# Patient Record
Sex: FEMALE | Race: BLACK OR AFRICAN AMERICAN | NOT HISPANIC OR LATINO | Employment: OTHER | URBAN - METROPOLITAN AREA
[De-identification: names, ages, dates, MRNs, and addresses within clinical notes are randomized per-mention and may not be internally consistent; named-entity substitution may affect disease eponyms.]

---

## 2024-03-18 ENCOUNTER — HOSPITAL ENCOUNTER (EMERGENCY)
Facility: HOSPITAL | Age: 64
Discharge: HOME | End: 2024-03-18
Payer: COMMERCIAL

## 2024-03-18 ENCOUNTER — APPOINTMENT (OUTPATIENT)
Dept: RADIOLOGY | Facility: HOSPITAL | Age: 64
End: 2024-03-18
Payer: COMMERCIAL

## 2024-03-18 VITALS
OXYGEN SATURATION: 100 % | SYSTOLIC BLOOD PRESSURE: 128 MMHG | BODY MASS INDEX: 21.66 KG/M2 | DIASTOLIC BLOOD PRESSURE: 84 MMHG | TEMPERATURE: 97 F | HEART RATE: 75 BPM | RESPIRATION RATE: 15 BRPM | HEIGHT: 65 IN | WEIGHT: 130 LBS

## 2024-03-18 DIAGNOSIS — M79.604 PAIN IN BOTH LOWER EXTREMITIES: Primary | ICD-10-CM
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
## 2024-03-18 DIAGNOSIS — M79.605 PAIN IN BOTH LOWER EXTREMITIES: Primary | ICD-10-CM

## 2024-03-18 PROCEDURE — 2500000001 HC RX 250 WO HCPCS SELF ADMINISTERED DRUGS (ALT 637 FOR MEDICARE OP): Performed by: PHYSICIAN ASSISTANT

## 2024-03-18 PROCEDURE — 73552 X-RAY EXAM OF FEMUR 2/>: CPT | Mod: BILATERAL PROCEDURE | Performed by: RADIOLOGY

## 2024-03-18 PROCEDURE — 72170 X-RAY EXAM OF PELVIS: CPT

## 2024-03-18 PROCEDURE — 99284 EMERGENCY DEPT VISIT MOD MDM: CPT

## 2024-03-18 PROCEDURE — 73552 X-RAY EXAM OF FEMUR 2/>: CPT | Mod: 50

## 2024-03-18 PROCEDURE — 72170 X-RAY EXAM OF PELVIS: CPT | Mod: FOREIGN READ | Performed by: RADIOLOGY

## 2024-03-18 RX ORDER — NAPROXEN 500 MG/1
500 TABLET ORAL
Qty: 30 TABLET | Refills: 0 | Status: SHIPPED | OUTPATIENT
Start: 2024-03-18 | End: 2024-04-02

## 2024-03-18 RX ORDER — IBUPROFEN 600 MG/1
600 TABLET ORAL ONCE
Status: COMPLETED | OUTPATIENT
Start: 2024-03-18 | End: 2024-03-18

## 2024-03-18 RX ORDER — CYCLOBENZAPRINE HCL 10 MG
10 TABLET ORAL 3 TIMES DAILY PRN
Qty: 20 TABLET | Refills: 0 | Status: SHIPPED | OUTPATIENT
Start: 2024-03-18

## 2024-03-18 RX ORDER — CYCLOBENZAPRINE HCL 5 MG
5 TABLET ORAL ONCE
Status: COMPLETED | OUTPATIENT
Start: 2024-03-18 | End: 2024-03-18

## 2024-03-18 RX ADMIN — CYCLOBENZAPRINE HYDROCHLORIDE 5 MG: 5 TABLET, FILM COATED ORAL at 18:30

## 2024-03-18 RX ADMIN — IBUPROFEN 600 MG: 600 TABLET, FILM COATED ORAL at 18:30

## 2024-03-18 ASSESSMENT — COLUMBIA-SUICIDE SEVERITY RATING SCALE - C-SSRS
2. HAVE YOU ACTUALLY HAD ANY THOUGHTS OF KILLING YOURSELF?: NO
1. IN THE PAST MONTH, HAVE YOU WISHED YOU WERE DEAD OR WISHED YOU COULD GO TO SLEEP AND NOT WAKE UP?: NO
6. HAVE YOU EVER DONE ANYTHING, STARTED TO DO ANYTHING, OR PREPARED TO DO ANYTHING TO END YOUR LIFE?: NO

## 2024-03-18 ASSESSMENT — PAIN SCALES - GENERAL: PAINLEVEL_OUTOF10: 2

## 2024-03-18 ASSESSMENT — PAIN - FUNCTIONAL ASSESSMENT
PAIN_FUNCTIONAL_ASSESSMENT: 0-10
PAIN_FUNCTIONAL_ASSESSMENT: 0-10
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
## 2024-04-03 ENCOUNTER — APPOINTMENT (OUTPATIENT)
Dept: CARDIOLOGY | Facility: HOSPITAL | Age: 64
End: 2024-04-03
Payer: COMMERCIAL

## 2024-04-03 ENCOUNTER — HOSPITAL ENCOUNTER (EMERGENCY)
Facility: HOSPITAL | Age: 64
Discharge: HOME | End: 2024-04-03
Attending: EMERGENCY MEDICINE
Payer: COMMERCIAL

## 2024-04-03 VITALS
RESPIRATION RATE: 16 BRPM | TEMPERATURE: 97.9 F | BODY MASS INDEX: 27.62 KG/M2 | OXYGEN SATURATION: 98 % | DIASTOLIC BLOOD PRESSURE: 77 MMHG | WEIGHT: 176 LBS | SYSTOLIC BLOOD PRESSURE: 121 MMHG | HEIGHT: 67 IN | HEART RATE: 88 BPM

## 2024-04-03 DIAGNOSIS — J18.9 PNEUMONIA OF LOWER LOBE DUE TO INFECTIOUS ORGANISM, UNSPECIFIED LATERALITY: ICD-10-CM

## 2024-04-03 DIAGNOSIS — M79.10 MYALGIA: Primary | ICD-10-CM

## 2024-04-03 LAB
ALBUMIN SERPL BCP-MCNC: 4.4 G/DL (ref 3.4–5)
ALP SERPL-CCNC: 119 U/L (ref 33–136)
ALT SERPL W P-5'-P-CCNC: 11 U/L (ref 7–45)
ANION GAP SERPL CALC-SCNC: 12 MMOL/L (ref 10–20)
AST SERPL W P-5'-P-CCNC: 12 U/L (ref 9–39)
BASOPHILS # BLD AUTO: 0.03 X10*3/UL (ref 0–0.1)
BASOPHILS NFR BLD AUTO: 0.4 %
BILIRUB SERPL-MCNC: 0.3 MG/DL (ref 0–1.2)
BUN SERPL-MCNC: 8 MG/DL (ref 6–23)
CALCIUM SERPL-MCNC: 10.4 MG/DL (ref 8.6–10.3)
CHLORIDE SERPL-SCNC: 104 MMOL/L (ref 98–107)
CK SERPL-CCNC: 141 U/L (ref 0–215)
CO2 SERPL-SCNC: 28 MMOL/L (ref 21–32)
CREAT SERPL-MCNC: 0.87 MG/DL (ref 0.5–1.05)
EGFRCR SERPLBLD CKD-EPI 2021: 75 ML/MIN/1.73M*2
EOSINOPHIL # BLD AUTO: 0.11 X10*3/UL (ref 0–0.7)
EOSINOPHIL NFR BLD AUTO: 1.4 %
ERYTHROCYTE [DISTWIDTH] IN BLOOD BY AUTOMATED COUNT: 12.9 % (ref 11.5–14.5)
GLUCOSE SERPL-MCNC: 103 MG/DL (ref 74–99)
HCT VFR BLD AUTO: 39.8 % (ref 36–46)
HGB BLD-MCNC: 13 G/DL (ref 12–16)
IMM GRANULOCYTES # BLD AUTO: 0.03 X10*3/UL (ref 0–0.7)
IMM GRANULOCYTES NFR BLD AUTO: 0.4 % (ref 0–0.9)
LYMPHOCYTES # BLD AUTO: 3.22 X10*3/UL (ref 1.2–4.8)
LYMPHOCYTES NFR BLD AUTO: 42 %
MCH RBC QN AUTO: 30.2 PG (ref 26–34)
MCHC RBC AUTO-ENTMCNC: 32.7 G/DL (ref 32–36)
MCV RBC AUTO: 93 FL (ref 80–100)
MONOCYTES # BLD AUTO: 0.46 X10*3/UL (ref 0.1–1)
MONOCYTES NFR BLD AUTO: 6 %
NEUTROPHILS # BLD AUTO: 3.81 X10*3/UL (ref 1.2–7.7)
NEUTROPHILS NFR BLD AUTO: 49.8 %
NRBC BLD-RTO: 0 /100 WBCS (ref 0–0)
PLATELET # BLD AUTO: 354 X10*3/UL (ref 150–450)
POTASSIUM SERPL-SCNC: 4.4 MMOL/L (ref 3.5–5.3)
PROT SERPL-MCNC: 7.4 G/DL (ref 6.4–8.2)
RBC # BLD AUTO: 4.3 X10*6/UL (ref 4–5.2)
SODIUM SERPL-SCNC: 140 MMOL/L (ref 136–145)
TSH SERPL-ACNC: 0.92 MIU/L (ref 0.44–3.98)
WBC # BLD AUTO: 7.7 X10*3/UL (ref 4.4–11.3)

## 2024-04-03 PROCEDURE — 84443 ASSAY THYROID STIM HORMONE: CPT | Performed by: PHYSICIAN ASSISTANT

## 2024-04-03 PROCEDURE — 82550 ASSAY OF CK (CPK): CPT | Performed by: PHYSICIAN ASSISTANT

## 2024-04-03 PROCEDURE — 36415 COLL VENOUS BLD VENIPUNCTURE: CPT | Performed by: PHYSICIAN ASSISTANT

## 2024-04-03 PROCEDURE — 80053 COMPREHEN METABOLIC PANEL: CPT | Performed by: PHYSICIAN ASSISTANT

## 2024-04-03 PROCEDURE — 85025 COMPLETE CBC W/AUTO DIFF WBC: CPT | Performed by: PHYSICIAN ASSISTANT

## 2024-04-03 PROCEDURE — 93005 ELECTROCARDIOGRAM TRACING: CPT

## 2024-04-03 PROCEDURE — 99283 EMERGENCY DEPT VISIT LOW MDM: CPT | Mod: 25

## 2024-04-03 PROCEDURE — 96372 THER/PROPH/DIAG INJ SC/IM: CPT | Performed by: PHYSICIAN ASSISTANT

## 2024-04-03 PROCEDURE — 2500000004 HC RX 250 GENERAL PHARMACY W/ HCPCS (ALT 636 FOR OP/ED): Performed by: PHYSICIAN ASSISTANT

## 2024-04-03 RX ORDER — KETOROLAC TROMETHAMINE 30 MG/ML
30 INJECTION, SOLUTION INTRAMUSCULAR; INTRAVENOUS ONCE
Status: COMPLETED | OUTPATIENT
Start: 2024-04-03 | End: 2024-04-03

## 2024-04-03 RX ORDER — ACETAMINOPHEN 325 MG/1
650 TABLET ORAL ONCE
Status: COMPLETED | OUTPATIENT
Start: 2024-04-03 | End: 2024-04-03

## 2024-04-03 RX ADMIN — ACETAMINOPHEN 650 MG: 325 TABLET ORAL at 17:58

## 2024-04-03 RX ADMIN — KETOROLAC TROMETHAMINE 30 MG: 30 INJECTION, SOLUTION INTRAMUSCULAR at 17:58

## 2024-04-03 ASSESSMENT — COLUMBIA-SUICIDE SEVERITY RATING SCALE - C-SSRS
1. IN THE PAST MONTH, HAVE YOU WISHED YOU WERE DEAD OR WISHED YOU COULD GO TO SLEEP AND NOT WAKE UP?: NO
2. HAVE YOU ACTUALLY HAD ANY THOUGHTS OF KILLING YOURSELF?: NO
6. HAVE YOU EVER DONE ANYTHING, STARTED TO DO ANYTHING, OR PREPARED TO DO ANYTHING TO END YOUR LIFE?: NO

## 2024-04-03 ASSESSMENT — PAIN DESCRIPTION - FREQUENCY: FREQUENCY: CONSTANT/CONTINUOUS

## 2024-04-03 ASSESSMENT — PAIN - FUNCTIONAL ASSESSMENT: PAIN_FUNCTIONAL_ASSESSMENT: 0-10

## 2024-04-03 ASSESSMENT — PAIN DESCRIPTION - PAIN TYPE: TYPE: ACUTE PAIN

## 2024-04-03 ASSESSMENT — PAIN DESCRIPTION - LOCATION: LOCATION: GENERALIZED

## 2024-04-03 ASSESSMENT — PAIN SCALES - GENERAL: PAINLEVEL_OUTOF10: 10 - WORST POSSIBLE PAIN

## 2024-04-03 NOTE — ED TRIAGE NOTES
Pt to ED for 10/10 generalized body pains described as sharp, shooting, voltage, burning, coughing with slight yellow/clear production. Pt stated she was diagnosed with pneumonia yesterday and is on antibiotics for it. She was at [facility] ED yesterday. Pt stated she takes percocet and took 1/2 pill at noon today.

## 2024-04-04 LAB
ATRIAL RATE: 70 BPM
P AXIS: 55 DEGREES
P OFFSET: 201 MS
P ONSET: 153 MS
PR INTERVAL: 136 MS
Q ONSET: 221 MS
QRS COUNT: 12 BEATS
QRS DURATION: 88 MS
QT INTERVAL: 400 MS
QTC CALCULATION(BAZETT): 432 MS
QTC FREDERICIA: 421 MS
R AXIS: 28 DEGREES
T AXIS: 24 DEGREES
T OFFSET: 421 MS
VENTRICULAR RATE: 70 BPM

## 2024-04-04 NOTE — ED PROVIDER NOTES
HPI   Chief Complaint   Patient presents with    Flu Symptoms     Pt to ED for 10/10 generalized body pains described as sharp, shooting, voltage, burning, coughing with slight yellow/clear production. Pt stated she was diagnosed with pneumonia yesterday and is on antibiotics for it. She was at [facility] ED yesterday. Pt stated she takes percocet and took 1/2 pill at noon today.       Is a 63-year-old female who presents with a complaint of generalized bodyaches she was actually seen yesterday at [facility] had a rather extensive workup that showed atelectasis versus infiltrate blood work was normal including a CK.  She was ultimately discharged after receiving Toradol states she has pain somewhat all over however she has Flexeril Percocet and ibuprofen at home.  She is taking the antibiotics as prescribed she has had 3 doses.  She is not short of breath.  No other concerns for complication.  She does have underlying schizophrenia however she does not appear to have any exacerbation..                          [place] Coma Scale Score: 15                     Patient History   No past medical history on file.  No past surgical history on file.  No family history on file.  Social History     Tobacco Use    Smoking status: Not on file    Smokeless tobacco: Not on file   Substance Use Topics    Alcohol use: Not on file    Drug use: Not on file       Physical Exam   ED Triage Vitals   Temperature Heart Rate Respirations BP   04/03/24 1643 04/03/24 1643 04/03/24 1643 04/03/24 1643   36.6 °C (97.9 °F) 90 18 121/77      Pulse Ox Temp Source Heart Rate Source Patient Position   04/03/24 1643 04/03/24 1643 04/03/24 1800 04/03/24 1643   96 % Oral Monitor Sitting      BP Location FiO2 (%)     04/03/24 1643 --     Left arm        Physical Exam  Vitals reviewed.   Constitutional:       General: She is not in acute distress.     Appearance: Normal appearance. She is normal weight. She is not ill-appearing, toxic-appearing or  diaphoretic.   HENT:      Head: Normocephalic and atraumatic.      Right Ear: External ear normal.      Left Ear: External ear normal.      Nose: Nose normal.      Mouth/Throat:      Mouth: Mucous membranes are moist.      Pharynx: No oropharyngeal exudate or posterior oropharyngeal erythema.   Eyes:      Extraocular Movements: Extraocular movements intact.      Conjunctiva/sclera: Conjunctivae normal.      Pupils: Pupils are equal, round, and reactive to light.   Cardiovascular:      Rate and Rhythm: Normal rate and regular rhythm.   Pulmonary:      Effort: Pulmonary effort is normal. No respiratory distress.      Breath sounds: No stridor.   Abdominal:      General: There is no distension.      Tenderness: There is no abdominal tenderness. There is no guarding or rebound.      Hernia: No hernia is present.   Musculoskeletal:         General: No swelling or deformity.      Cervical back: Normal range of motion. No tenderness.   Skin:     Capillary Refill: Capillary refill takes less than 2 seconds.      Findings: No rash.   Neurological:      General: No focal deficit present.      Mental Status: She is alert and oriented to person, place, and time. Mental status is at baseline.      Cranial Nerves: No cranial nerve deficit.      Sensory: No sensory deficit.      Motor: No weakness.   Psychiatric:         Mood and Affect: Mood normal.         Behavior: Behavior normal.         Thought Content: Thought content normal.         Judgment: Judgment normal.         ED Course & MDM   Diagnoses as of 04/03/24 2128   Myalgia   Pneumonia of lower lobe due to infectious organism, unspecified laterality       Medical Decision Making  Differential diagnosis was rhabdomyolysis thyroid disease, sepsis,    Given Toradol patient has some improvement    Seen by ED attending labs and chart from yesterday as well as labs today were reviewed    Amount and/or Complexity of Data Reviewed  External Data Reviewed: labs and radiology.      Details: Essentially normal labs, infiltrate        Procedure  Procedures     Martinez Peck PA-C  04/03/24 5578

## 2024-04-17 ENCOUNTER — HOSPITAL ENCOUNTER (OUTPATIENT)
Dept: RADIOLOGY | Facility: CLINIC | Age: 64
Discharge: HOME | End: 2024-04-17
Payer: COMMERCIAL

## 2024-04-17 ENCOUNTER — HOSPITAL ENCOUNTER (OUTPATIENT)
Dept: RADIOLOGY | Facility: EXTERNAL LOCATION | Age: 64
Discharge: HOME | End: 2024-04-17

## 2024-04-17 VITALS — WEIGHT: 175.93 LBS | BODY MASS INDEX: 27.61 KG/M2 | HEIGHT: 67 IN

## 2024-04-17 DIAGNOSIS — Z12.31 ENCOUNTER FOR SCREENING MAMMOGRAM FOR MALIGNANT NEOPLASM OF BREAST: ICD-10-CM

## 2024-04-17 PROCEDURE — 77063 BREAST TOMOSYNTHESIS BI: CPT | Performed by: RADIOLOGY

## 2024-04-17 PROCEDURE — 77067 SCR MAMMO BI INCL CAD: CPT

## 2024-04-17 PROCEDURE — 77067 SCR MAMMO BI INCL CAD: CPT | Performed by: RADIOLOGY

## 2024-04-18 ENCOUNTER — APPOINTMENT (OUTPATIENT)
Dept: PRIMARY CARE | Facility: CLINIC | Age: 64
End: 2024-04-18
Payer: COMMERCIAL

## 2024-04-23 ENCOUNTER — HOSPITAL ENCOUNTER (OUTPATIENT)
Dept: RADIOLOGY | Facility: EXTERNAL LOCATION | Age: 64
Discharge: HOME | End: 2024-04-23
Payer: COMMERCIAL

## 2024-04-24 ENCOUNTER — APPOINTMENT (OUTPATIENT)
Dept: CARDIOLOGY | Facility: HOSPITAL | Age: 64
End: 2024-04-24
Payer: COMMERCIAL

## 2024-04-24 ENCOUNTER — HOSPITAL ENCOUNTER (EMERGENCY)
Facility: HOSPITAL | Age: 64
Discharge: HOME | End: 2024-04-24
Attending: EMERGENCY MEDICINE
Payer: COMMERCIAL

## 2024-04-24 ENCOUNTER — APPOINTMENT (OUTPATIENT)
Dept: RADIOLOGY | Facility: HOSPITAL | Age: 64
End: 2024-04-24
Payer: COMMERCIAL

## 2024-04-24 VITALS
HEIGHT: 67 IN | RESPIRATION RATE: 17 BRPM | TEMPERATURE: 97.9 F | DIASTOLIC BLOOD PRESSURE: 93 MMHG | BODY MASS INDEX: 27.62 KG/M2 | HEART RATE: 68 BPM | WEIGHT: 176 LBS | OXYGEN SATURATION: 99 % | SYSTOLIC BLOOD PRESSURE: 151 MMHG

## 2024-04-24 DIAGNOSIS — R10.9 ABDOMINAL PAIN, UNSPECIFIED ABDOMINAL LOCATION: Primary | ICD-10-CM

## 2024-04-24 DIAGNOSIS — K80.20 GALLSTONES: ICD-10-CM

## 2024-04-24 LAB
ALBUMIN SERPL BCP-MCNC: 4.3 G/DL (ref 3.4–5)
ALP SERPL-CCNC: 90 U/L (ref 33–136)
ALT SERPL W P-5'-P-CCNC: 9 U/L (ref 7–45)
ANION GAP SERPL CALC-SCNC: 13 MMOL/L (ref 10–20)
APPEARANCE UR: CLEAR
AST SERPL W P-5'-P-CCNC: 12 U/L (ref 9–39)
ATRIAL RATE: 64 BPM
BACTERIA #/AREA URNS AUTO: ABNORMAL /HPF
BASOPHILS # BLD AUTO: 0.03 X10*3/UL (ref 0–0.1)
BASOPHILS NFR BLD AUTO: 0.5 %
BILIRUB SERPL-MCNC: 0.3 MG/DL (ref 0–1.2)
BILIRUB UR STRIP.AUTO-MCNC: NEGATIVE MG/DL
BUN SERPL-MCNC: 6 MG/DL (ref 6–23)
CALCIUM SERPL-MCNC: 9.3 MG/DL (ref 8.6–10.3)
CHLORIDE SERPL-SCNC: 107 MMOL/L (ref 98–107)
CO2 SERPL-SCNC: 24 MMOL/L (ref 21–32)
COLOR UR: COLORLESS
CREAT SERPL-MCNC: 0.78 MG/DL (ref 0.5–1.05)
EGFRCR SERPLBLD CKD-EPI 2021: 86 ML/MIN/1.73M*2
EOSINOPHIL # BLD AUTO: 0.12 X10*3/UL (ref 0–0.7)
EOSINOPHIL NFR BLD AUTO: 1.9 %
ERYTHROCYTE [DISTWIDTH] IN BLOOD BY AUTOMATED COUNT: 13.2 % (ref 11.5–14.5)
GLUCOSE SERPL-MCNC: 96 MG/DL (ref 74–99)
GLUCOSE UR STRIP.AUTO-MCNC: NORMAL MG/DL
HCT VFR BLD AUTO: 37.1 % (ref 36–46)
HGB BLD-MCNC: 12.2 G/DL (ref 12–16)
IMM GRANULOCYTES # BLD AUTO: 0.02 X10*3/UL (ref 0–0.7)
IMM GRANULOCYTES NFR BLD AUTO: 0.3 % (ref 0–0.9)
KETONES UR STRIP.AUTO-MCNC: NEGATIVE MG/DL
LACTATE SERPL-SCNC: 1.3 MMOL/L (ref 0.4–2)
LEUKOCYTE ESTERASE UR QL STRIP.AUTO: ABNORMAL
LIPASE SERPL-CCNC: 35 U/L (ref 9–82)
LYMPHOCYTES # BLD AUTO: 2.31 X10*3/UL (ref 1.2–4.8)
LYMPHOCYTES NFR BLD AUTO: 36.9 %
MCH RBC QN AUTO: 30.5 PG (ref 26–34)
MCHC RBC AUTO-ENTMCNC: 32.9 G/DL (ref 32–36)
MCV RBC AUTO: 93 FL (ref 80–100)
MONOCYTES # BLD AUTO: 0.38 X10*3/UL (ref 0.1–1)
MONOCYTES NFR BLD AUTO: 6.1 %
NEUTROPHILS # BLD AUTO: 3.4 X10*3/UL (ref 1.2–7.7)
NEUTROPHILS NFR BLD AUTO: 54.3 %
NITRITE UR QL STRIP.AUTO: NEGATIVE
NRBC BLD-RTO: 0 /100 WBCS (ref 0–0)
P AXIS: 50 DEGREES
P OFFSET: 200 MS
P ONSET: 150 MS
PH UR STRIP.AUTO: 7 [PH]
PLATELET # BLD AUTO: 316 X10*3/UL (ref 150–450)
POTASSIUM SERPL-SCNC: 4.1 MMOL/L (ref 3.5–5.3)
PR INTERVAL: 140 MS
PROT SERPL-MCNC: 6.9 G/DL (ref 6.4–8.2)
PROT UR STRIP.AUTO-MCNC: NEGATIVE MG/DL
Q ONSET: 220 MS
QRS COUNT: 11 BEATS
QRS DURATION: 88 MS
QT INTERVAL: 402 MS
QTC CALCULATION(BAZETT): 414 MS
QTC FREDERICIA: 410 MS
R AXIS: 51 DEGREES
RBC # BLD AUTO: 4 X10*6/UL (ref 4–5.2)
RBC # UR STRIP.AUTO: NEGATIVE /UL
RBC #/AREA URNS AUTO: >20 /HPF
SODIUM SERPL-SCNC: 140 MMOL/L (ref 136–145)
SP GR UR STRIP.AUTO: 1
SQUAMOUS #/AREA URNS AUTO: ABNORMAL /HPF
T AXIS: 29 DEGREES
T OFFSET: 421 MS
UROBILINOGEN UR STRIP.AUTO-MCNC: NORMAL MG/DL
VENTRICULAR RATE: 64 BPM
WBC # BLD AUTO: 6.3 X10*3/UL (ref 4.4–11.3)
WBC #/AREA URNS AUTO: ABNORMAL /HPF

## 2024-04-24 PROCEDURE — 83690 ASSAY OF LIPASE: CPT | Performed by: EMERGENCY MEDICINE

## 2024-04-24 PROCEDURE — 83605 ASSAY OF LACTIC ACID: CPT | Performed by: EMERGENCY MEDICINE

## 2024-04-24 PROCEDURE — 96374 THER/PROPH/DIAG INJ IV PUSH: CPT | Mod: 59

## 2024-04-24 PROCEDURE — 96361 HYDRATE IV INFUSION ADD-ON: CPT

## 2024-04-24 PROCEDURE — 93005 ELECTROCARDIOGRAM TRACING: CPT

## 2024-04-24 PROCEDURE — 87086 URINE CULTURE/COLONY COUNT: CPT | Mod: AHULAB | Performed by: EMERGENCY MEDICINE

## 2024-04-24 PROCEDURE — 74177 CT ABD & PELVIS W/CONTRAST: CPT | Performed by: RADIOLOGY

## 2024-04-24 PROCEDURE — 85025 COMPLETE CBC W/AUTO DIFF WBC: CPT | Performed by: EMERGENCY MEDICINE

## 2024-04-24 PROCEDURE — 74177 CT ABD & PELVIS W/CONTRAST: CPT

## 2024-04-24 PROCEDURE — 36415 COLL VENOUS BLD VENIPUNCTURE: CPT | Performed by: EMERGENCY MEDICINE

## 2024-04-24 PROCEDURE — 2550000001 HC RX 255 CONTRASTS: Performed by: EMERGENCY MEDICINE

## 2024-04-24 PROCEDURE — 96372 THER/PROPH/DIAG INJ SC/IM: CPT | Performed by: EMERGENCY MEDICINE

## 2024-04-24 PROCEDURE — 81001 URINALYSIS AUTO W/SCOPE: CPT | Performed by: EMERGENCY MEDICINE

## 2024-04-24 PROCEDURE — 80053 COMPREHEN METABOLIC PANEL: CPT | Performed by: EMERGENCY MEDICINE

## 2024-04-24 PROCEDURE — 2500000004 HC RX 250 GENERAL PHARMACY W/ HCPCS (ALT 636 FOR OP/ED): Mod: 59 | Performed by: EMERGENCY MEDICINE

## 2024-04-24 PROCEDURE — 99285 EMERGENCY DEPT VISIT HI MDM: CPT | Mod: 25

## 2024-04-24 RX ORDER — DICYCLOMINE HYDROCHLORIDE 10 MG/ML
20 INJECTION INTRAMUSCULAR ONCE
Status: COMPLETED | OUTPATIENT
Start: 2024-04-24 | End: 2024-04-24

## 2024-04-24 RX ORDER — ONDANSETRON HYDROCHLORIDE 2 MG/ML
4 INJECTION, SOLUTION INTRAVENOUS ONCE
Status: COMPLETED | OUTPATIENT
Start: 2024-04-24 | End: 2024-04-24

## 2024-04-24 RX ADMIN — DICYCLOMINE HYDROCHLORIDE 20 MG: 10 INJECTION, SOLUTION INTRAMUSCULAR at 15:37

## 2024-04-24 RX ADMIN — IOHEXOL 75 ML: 350 INJECTION, SOLUTION INTRAVENOUS at 16:38

## 2024-04-24 RX ADMIN — ONDANSETRON 4 MG: 2 INJECTION INTRAMUSCULAR; INTRAVENOUS at 15:36

## 2024-04-24 RX ADMIN — SODIUM CHLORIDE 1000 ML: 9 INJECTION, SOLUTION INTRAVENOUS at 15:37

## 2024-04-24 ASSESSMENT — PAIN SCALES - GENERAL
PAINLEVEL_OUTOF10: 10 - WORST POSSIBLE PAIN
PAINLEVEL_OUTOF10: 0 - NO PAIN

## 2024-04-24 ASSESSMENT — COLUMBIA-SUICIDE SEVERITY RATING SCALE - C-SSRS
2. HAVE YOU ACTUALLY HAD ANY THOUGHTS OF KILLING YOURSELF?: NO
6. HAVE YOU EVER DONE ANYTHING, STARTED TO DO ANYTHING, OR PREPARED TO DO ANYTHING TO END YOUR LIFE?: NO
1. IN THE PAST MONTH, HAVE YOU WISHED YOU WERE DEAD OR WISHED YOU COULD GO TO SLEEP AND NOT WAKE UP?: NO
6. HAVE YOU EVER DONE ANYTHING, STARTED TO DO ANYTHING, OR PREPARED TO DO ANYTHING TO END YOUR LIFE?: NO

## 2024-04-24 ASSESSMENT — PAIN - FUNCTIONAL ASSESSMENT: PAIN_FUNCTIONAL_ASSESSMENT: 0-10

## 2024-04-24 NOTE — ED PROVIDER NOTES
HPI   Chief Complaint   Patient presents with    Abdominal Pain       This is a 63-year-old female who presents to the emergency department complaining of abdominal pain.  The patient states that symptoms started around 130 after she ate a sandwich.  The pain is diffuse cramping.  She reports nausea and feels like she is going to vomit.  She denies diarrhea or constipation.  She denies fever.  Denies blood in her stool.    Past medical history: Back pain, chart review also shows schizophrenia                          [place] Coma Scale Score: 15                     Patient History   No past medical history on file.  No past surgical history on file.  No family history on file.  Social History     Tobacco Use    Smoking status: Not on file    Smokeless tobacco: Not on file   Substance Use Topics    Alcohol use: Not on file    Drug use: Not on file       Physical Exam   ED Triage Vitals   Temperature Heart Rate Respirations BP   04/24/24 1450 04/24/24 1450 04/24/24 1450 04/24/24 1450   36.6 °C (97.9 °F) 87 15 148/82      Pulse Ox Temp src Heart Rate Source Patient Position   04/24/24 1450 -- -- 04/24/24 1531   98 %   Lying      BP Location FiO2 (%)     04/24/24 1531 --     Right leg        Physical Exam  Vitals and nursing note reviewed.   HENT:      Head: Normocephalic and atraumatic.      Nose: Nose normal.   Eyes:      Conjunctiva/sclera: Conjunctivae normal.   Cardiovascular:      Rate and Rhythm: Normal rate and regular rhythm.      Pulses: Normal pulses.      Heart sounds: Normal heart sounds.   Pulmonary:      Effort: Pulmonary effort is normal.      Breath sounds: Normal breath sounds.   Abdominal:      General: Bowel sounds are normal.      Palpations: Abdomen is soft.      Tenderness: There is generalized abdominal tenderness. There is no guarding or rebound.   Musculoskeletal:         General: Normal range of motion.      Cervical back: Normal range of motion and neck supple.   Skin:     Findings: No rash.    Neurological:      General: No focal deficit present.      Mental Status: She is alert and oriented to person, place, and time.   Psychiatric:         Mood and Affect: Mood normal.         ED Course & MDM   Diagnoses as of 04/24/24 1827   Abdominal pain, unspecified abdominal location   Gallstones       Medical Decision Making  Differential diagnosis: I have considered the following conditions during my assessment of this patient's condition: Gastritis, gastroenteritis, GERD, food poisoning, ileus, bowel obstruction, hernia, acute appendicitis, cholecystitis, diverticulitis, colitis, renal colic.    This is a 63-year-old female who presents to the emergency department with abdominal pain and nausea.  Patient will be further evaluated with labs, urinalysis and CT scan.  She was treated with IV fluids, Zofran and Bentyl.  Labs which included lipase, lactate, CMP and CBC were all normal.  Urinalysis had 11-20 white blood cells.  The patient has no urine symptoms.  Will await culture prior to initiating antibiotics.  CT scan showed cholelithiasis, trace pleural effusion, renal pelviectasis and a normal appendix.  On repeat assessment, the patient felt much better.  She was able to drink ginger ale in the emergency department without pain.  Discussed follow-up with general surgery.  Patient is appropriate for discharge.    Amount and/or Complexity of Data Reviewed  ECG/medicine tests: independent interpretation performed.     Details: Normal sinus rhythm, heart rate 64, no ST elevation.        Procedure  Procedures     Gerardo Ye MD  04/24/24 1828

## 2024-04-25 ENCOUNTER — HOSPITAL ENCOUNTER (EMERGENCY)
Facility: HOSPITAL | Age: 64
Discharge: HOME | End: 2024-04-26
Attending: STUDENT IN AN ORGANIZED HEALTH CARE EDUCATION/TRAINING PROGRAM
Payer: COMMERCIAL

## 2024-04-25 DIAGNOSIS — R10.84 ABDOMINAL PAIN, GENERALIZED: Primary | ICD-10-CM

## 2024-04-25 LAB
ALBUMIN SERPL BCP-MCNC: 4.4 G/DL (ref 3.4–5)
ALP SERPL-CCNC: 96 U/L (ref 33–136)
ALT SERPL W P-5'-P-CCNC: 12 U/L (ref 7–45)
ANION GAP SERPL CALC-SCNC: 15 MMOL/L (ref 10–20)
APPEARANCE UR: ABNORMAL
AST SERPL W P-5'-P-CCNC: 11 U/L (ref 9–39)
BACTERIA #/AREA URNS AUTO: ABNORMAL /HPF
BASOPHILS # BLD AUTO: 0.03 X10*3/UL (ref 0–0.1)
BASOPHILS NFR BLD AUTO: 0.4 %
BILIRUB SERPL-MCNC: 0.3 MG/DL (ref 0–1.2)
BILIRUB UR STRIP.AUTO-MCNC: NEGATIVE MG/DL
BUN SERPL-MCNC: 7 MG/DL (ref 6–23)
CALCIUM SERPL-MCNC: 9.5 MG/DL (ref 8.6–10.3)
CHLORIDE SERPL-SCNC: 106 MMOL/L (ref 98–107)
CO2 SERPL-SCNC: 23 MMOL/L (ref 21–32)
COLOR UR: COLORLESS
CREAT SERPL-MCNC: 0.83 MG/DL (ref 0.5–1.05)
EGFRCR SERPLBLD CKD-EPI 2021: 80 ML/MIN/1.73M*2
EOSINOPHIL # BLD AUTO: 0.13 X10*3/UL (ref 0–0.7)
EOSINOPHIL NFR BLD AUTO: 1.9 %
ERYTHROCYTE [DISTWIDTH] IN BLOOD BY AUTOMATED COUNT: 13.2 % (ref 11.5–14.5)
GLUCOSE SERPL-MCNC: 109 MG/DL (ref 74–99)
GLUCOSE UR STRIP.AUTO-MCNC: NORMAL MG/DL
HCT VFR BLD AUTO: 36.8 % (ref 36–46)
HGB BLD-MCNC: 12.1 G/DL (ref 12–16)
IMM GRANULOCYTES # BLD AUTO: 0.01 X10*3/UL (ref 0–0.7)
IMM GRANULOCYTES NFR BLD AUTO: 0.1 % (ref 0–0.9)
KETONES UR STRIP.AUTO-MCNC: NEGATIVE MG/DL
LACTATE SERPL-SCNC: 0.9 MMOL/L (ref 0.4–2)
LEUKOCYTE ESTERASE UR QL STRIP.AUTO: ABNORMAL
LIPASE SERPL-CCNC: 35 U/L (ref 9–82)
LYMPHOCYTES # BLD AUTO: 3.33 X10*3/UL (ref 1.2–4.8)
LYMPHOCYTES NFR BLD AUTO: 49.3 %
MCH RBC QN AUTO: 30.2 PG (ref 26–34)
MCHC RBC AUTO-ENTMCNC: 32.9 G/DL (ref 32–36)
MCV RBC AUTO: 92 FL (ref 80–100)
MONOCYTES # BLD AUTO: 0.52 X10*3/UL (ref 0.1–1)
MONOCYTES NFR BLD AUTO: 7.7 %
MUCOUS THREADS #/AREA URNS AUTO: ABNORMAL /LPF
NEUTROPHILS # BLD AUTO: 2.73 X10*3/UL (ref 1.2–7.7)
NEUTROPHILS NFR BLD AUTO: 40.6 %
NITRITE UR QL STRIP.AUTO: NEGATIVE
NRBC BLD-RTO: 0 /100 WBCS (ref 0–0)
PH UR STRIP.AUTO: 5.5 [PH]
PLATELET # BLD AUTO: 317 X10*3/UL (ref 150–450)
POTASSIUM SERPL-SCNC: 3.9 MMOL/L (ref 3.5–5.3)
PROT SERPL-MCNC: 6.8 G/DL (ref 6.4–8.2)
PROT UR STRIP.AUTO-MCNC: NEGATIVE MG/DL
RBC # BLD AUTO: 4.01 X10*6/UL (ref 4–5.2)
RBC # UR STRIP.AUTO: ABNORMAL /UL
RBC #/AREA URNS AUTO: >20 /HPF
SODIUM SERPL-SCNC: 140 MMOL/L (ref 136–145)
SP GR UR STRIP.AUTO: 1
SQUAMOUS #/AREA URNS AUTO: ABNORMAL /HPF
UROBILINOGEN UR STRIP.AUTO-MCNC: NORMAL MG/DL
WBC # BLD AUTO: 6.8 X10*3/UL (ref 4.4–11.3)
WBC #/AREA URNS AUTO: ABNORMAL /HPF

## 2024-04-25 PROCEDURE — 2500000004 HC RX 250 GENERAL PHARMACY W/ HCPCS (ALT 636 FOR OP/ED): Performed by: STUDENT IN AN ORGANIZED HEALTH CARE EDUCATION/TRAINING PROGRAM

## 2024-04-25 PROCEDURE — 36415 COLL VENOUS BLD VENIPUNCTURE: CPT | Performed by: STUDENT IN AN ORGANIZED HEALTH CARE EDUCATION/TRAINING PROGRAM

## 2024-04-25 PROCEDURE — 83605 ASSAY OF LACTIC ACID: CPT | Performed by: STUDENT IN AN ORGANIZED HEALTH CARE EDUCATION/TRAINING PROGRAM

## 2024-04-25 PROCEDURE — 85025 COMPLETE CBC W/AUTO DIFF WBC: CPT | Performed by: STUDENT IN AN ORGANIZED HEALTH CARE EDUCATION/TRAINING PROGRAM

## 2024-04-25 PROCEDURE — 96375 TX/PRO/DX INJ NEW DRUG ADDON: CPT

## 2024-04-25 PROCEDURE — 96365 THER/PROPH/DIAG IV INF INIT: CPT

## 2024-04-25 PROCEDURE — 99284 EMERGENCY DEPT VISIT MOD MDM: CPT

## 2024-04-25 PROCEDURE — 81001 URINALYSIS AUTO W/SCOPE: CPT | Performed by: STUDENT IN AN ORGANIZED HEALTH CARE EDUCATION/TRAINING PROGRAM

## 2024-04-25 PROCEDURE — 80053 COMPREHEN METABOLIC PANEL: CPT | Performed by: STUDENT IN AN ORGANIZED HEALTH CARE EDUCATION/TRAINING PROGRAM

## 2024-04-25 PROCEDURE — 83690 ASSAY OF LIPASE: CPT | Performed by: STUDENT IN AN ORGANIZED HEALTH CARE EDUCATION/TRAINING PROGRAM

## 2024-04-25 RX ORDER — DROPERIDOL 2.5 MG/ML
2.5 INJECTION, SOLUTION INTRAMUSCULAR; INTRAVENOUS ONCE
Status: COMPLETED | OUTPATIENT
Start: 2024-04-25 | End: 2024-04-25

## 2024-04-25 RX ADMIN — CEFTRIAXONE 2 G: 2 INJECTION, POWDER, FOR SOLUTION INTRAMUSCULAR; INTRAVENOUS at 22:57

## 2024-04-25 RX ADMIN — DROPERIDOL 2.5 MG: 2.5 INJECTION, SOLUTION INTRAMUSCULAR; INTRAVENOUS at 22:30

## 2024-04-25 ASSESSMENT — COLUMBIA-SUICIDE SEVERITY RATING SCALE - C-SSRS
6. HAVE YOU EVER DONE ANYTHING, STARTED TO DO ANYTHING, OR PREPARED TO DO ANYTHING TO END YOUR LIFE?: NO
1. IN THE PAST MONTH, HAVE YOU WISHED YOU WERE DEAD OR WISHED YOU COULD GO TO SLEEP AND NOT WAKE UP?: NO
6. HAVE YOU EVER DONE ANYTHING, STARTED TO DO ANYTHING, OR PREPARED TO DO ANYTHING TO END YOUR LIFE?: NO
2. HAVE YOU ACTUALLY HAD ANY THOUGHTS OF KILLING YOURSELF?: NO

## 2024-04-25 ASSESSMENT — PAIN DESCRIPTION - LOCATION: LOCATION: ABDOMEN

## 2024-04-25 ASSESSMENT — PAIN DESCRIPTION - PAIN TYPE: TYPE: ACUTE PAIN

## 2024-04-25 ASSESSMENT — PAIN - FUNCTIONAL ASSESSMENT: PAIN_FUNCTIONAL_ASSESSMENT: 0-10

## 2024-04-25 ASSESSMENT — PAIN SCALES - GENERAL: PAINLEVEL_OUTOF10: 10 - WORST POSSIBLE PAIN

## 2024-04-25 ASSESSMENT — PAIN DESCRIPTION - FREQUENCY: FREQUENCY: CONSTANT/CONTINUOUS

## 2024-04-26 VITALS
RESPIRATION RATE: 16 BRPM | OXYGEN SATURATION: 96 % | BODY MASS INDEX: 27.62 KG/M2 | HEIGHT: 67 IN | TEMPERATURE: 98.4 F | WEIGHT: 176 LBS | DIASTOLIC BLOOD PRESSURE: 84 MMHG | SYSTOLIC BLOOD PRESSURE: 142 MMHG | HEART RATE: 62 BPM

## 2024-04-26 LAB — BACTERIA UR CULT: NORMAL

## 2024-04-26 RX ORDER — CEPHALEXIN 500 MG/1
500 CAPSULE ORAL 4 TIMES DAILY
Qty: 28 CAPSULE | Refills: 0 | Status: SHIPPED | OUTPATIENT
Start: 2024-04-26 | End: 2024-04-26

## 2024-04-26 RX ORDER — CEPHALEXIN 500 MG/1
500 CAPSULE ORAL 4 TIMES DAILY
Qty: 28 CAPSULE | Refills: 0 | Status: SHIPPED | OUTPATIENT
Start: 2024-04-26 | End: 2024-05-03

## 2024-04-26 NOTE — DISCHARGE INSTRUCTIONS
You have been seen at a [facility].  Please follow-up with your primary care provider in the next 1 to 2 days for further evaluation and routine follow-up.  Please return to the emergency room if having any worsening symptoms.  Please follow-up with any specialists if discussed during your emergency room stay.

## 2024-04-26 NOTE — ED TRIAGE NOTES
Pt to ED after being found in the parking lot having trouble coming inside by EMS who finished dropping off another pt. They placed her into a wheelchair and brought her to the lobby. Pt stated she was seen yesterday in ED, diagnosed with kidney stones, not given any medication for pain and has increased generalized abdominal pain. Pain radiates to bilateral legs and feet and she has slight SOB with the pain. No chest pain.

## 2024-04-26 NOTE — ED PROVIDER NOTES
EMERGENCY MEDICINE EVALUATION NOTE    History of Present Illness     Chief Complaint:   Chief Complaint   Patient presents with    Abdominal Pain     Pt to ED after being found in the parking lot having trouble coming inside by EMS who finished dropping off another pt. They placed her into a wheelchair and brought her to the lobby. Pt stated she was seen yesterday in ED, diagnosed with kidney stones, not given any medication for pain and has increased generalized abdominal pain. Pain radiates to bilateral legs and feet and she has slight SOB with the pain. No chest pain.        HPI: Cristal Kilgore is a 63 y.o. female with past medical history of psychotic disorder, chronic back pain, and schizophrenia who presents with complaint of abdominal pain.  Patient was reportedly found in the parking lot having trouble coming inside by EMS to finish dropping off another patient.  She was brought in with a wheelchair.  Patient was seen yesterday due to similar complaints.  She does admit severe generalized abdominal pain discomfort with radiation to her bilateral lower extremities.  Patient has been seen multiple times for similar symptoms in the past on chart check.  She otherwise admits to normal bowel movements.  Denies any nausea, vomiting, dysuria, hematuria, fever, chills.  Denies taking medication for pain control at home.    Previous History   No past medical history on file.  No past surgical history on file.     No family history on file.  No Known Allergies  Current Outpatient Medications   Medication Instructions    cyclobenzaprine (FLEXERIL) 10 mg, oral, 3 times daily PRN       Physical Exam     Appearance: Alert, oriented , cooperative,  in acute distress. Well nourished & well hydrated.     Skin: Intact,  dry skin, no lesions, rash, petechiae or purpura.      Eyes: PERRLA, EOMs intact,  Conjunctiva pink with no redness or exudates. Cornea & anterior chamber are clear, Eyelids without lesions. No scleral  icterus.      ENT: Hearing grossly intact. External auditory canals patent, tympanic membranes intact with visible landmarks. Nares patent, mucus membranes moist. Dentition without lesions. Pharynx clear, uvula midline.      Neck: Supple, without meningismus. Thyroid not palpable. Trachea at midline. No lymphadenopathy.     Pulmonary: Clear bilaterally with good chest wall excursion. No rales, rhonchi or wheezing. No accessory muscle use or stridor.     Cardiac: Normal S1, S2 without murmur, rub, gallop or extrasystole. No JVD, Carotids without bruits.     Abdomen: Soft, moderate generalized abdominal tenderness, active bowel sounds.  No palpable organomegaly.  No rebound or guarding.  No CVA tenderness.     Genitourinary: Exam deferred.     Musculoskeletal: Full range of motion. no pain, edema, or deformity. Pulses full and equal. No cyanosis or clubbing.      Neurological:  Cranial nerves II through XII are grossly intact, finger-nose touch is normal, normal sensation, no weakness, no focal findings identified.     Psychiatric: Appropriate mood and affect.      Results     Labs Reviewed   COMPREHENSIVE METABOLIC PANEL - Abnormal       Result Value    Glucose 109 (*)     Sodium 140      Potassium 3.9      Chloride 106      Bicarbonate 23      Anion Gap 15      Urea Nitrogen 7      Creatinine 0.83      eGFR 80      Calcium 9.5      Albumin 4.4      Alkaline Phosphatase 96      Total Protein 6.8      AST 11      Bilirubin, Total 0.3      ALT 12     URINALYSIS WITH REFLEX MICROSCOPIC - Abnormal    Color, Urine Colorless (*)     Appearance, Urine Turbid (*)     Specific Gravity, Urine 1.003 (*)     pH, Urine 5.5      Protein, Urine NEGATIVE      Glucose, Urine Normal      Blood, Urine 0.03 (TRACE) (*)     Ketones, Urine NEGATIVE      Bilirubin, Urine NEGATIVE      Urobilinogen, Urine Normal      Nitrite, Urine NEGATIVE      Leukocyte Esterase, Urine 500 Ana Paula/µL (*)    MICROSCOPIC ONLY, URINE - Abnormal    WBC, Urine  "21-50 (*)     RBC, Urine >20 (*)     Squamous Epithelial Cells, Urine 1-9 (SPARSE)      Bacteria, Urine 1+ (*)     Mucus, Urine FEW     LIPASE - Normal    Lipase 35      Narrative:     Venipuncture immediately after or during the administration of Metamizole may lead to falsely low results. Testing should be performed immediately prior to Metamizole dosing.   LACTATE - Normal    Lactate 0.9      Narrative:     Venipuncture immediately after or during the administration of Metamizole may lead to falsely low results. Testing should be performed immediately  prior to Metamizole dosing.   CBC WITH AUTO DIFFERENTIAL    WBC 6.8      nRBC 0.0      RBC 4.01      Hemoglobin 12.1      Hematocrit 36.8      MCV 92      MCH 30.2      MCHC 32.9      RDW 13.2      Platelets 317      Neutrophils % 40.6      Immature Granulocytes %, Automated 0.1      Lymphocytes % 49.3      Monocytes % 7.7      Eosinophils % 1.9      Basophils % 0.4      Neutrophils Absolute 2.73      Immature Granulocytes Absolute, Automated 0.01      Lymphocytes Absolute 3.33      Monocytes Absolute 0.52      Eosinophils Absolute 0.13      Basophils Absolute 0.03       No orders to display         ED Course & Medical Decision Making     Medications   droperidol (Inapsine) injection 2.5 mg (2.5 mg intravenous Given 4/25/24 2230)   cefTRIAXone (Rocephin) in dextrose 5 % water (D5W) 50 mL IV 2 g (0 g intravenous Stopped 4/25/24 2327)     Diagnoses as of 04/26/24 0017   Abdominal pain, generalized     Heart Rate:  [55-75]   Temperature:  [36.9 °C (98.4 °F)]   Respirations:  [16-20]   BP: (138-172)/()   Height:  [170.2 cm (5' 7\")]   Weight:  [79.8 kg (176 lb)]   Pulse Ox:  [96 %-99 %]      Cristal Kilgore is a 63 y.o. female with past medical history of psychotic disorder, chronic back pain, and schizophrenia who presents with complaint of abdominal pain.  Patient is otherwise hemodynamic stable and afebrile initial examination.  No significant peritonitic " findings initial examination with generalized abdominal tenderness.  Low concern for need of emergent imaging as she did have a CT scan of the abdomen pelvis completed during her visit yesterday.  This pain is most likely chronic in nature.  Low concern for acute pathology.  Did consider viscus perforation, choledocholithiasis, cholecystitis.  She was given droperidol for additional treatment.  Lab work obtained with normal lactate and low concern for mesenteric ischemia.  Leukocytosis or anemia.  LFTs and bilirubin normal.  Lipase normal.  No renal dysfunction.  She does have evidence of urinary tract infection which could be the cause of her symptoms.  She did have this also yesterday during her additional ER visit with urine culture still pending.  She did have significant relief and was able to tolerate oral intake without difficulty.  Stable for home discharge at this time.  She was given a dose of IV Rocephin and will be discharged with 7 days of Keflex for treatment.  Daughter did request a psychiatry referral as she wants her switched here from [place] and this was placed.    Procedures   Procedures    Diagnosis     1. Abdominal pain, generalized        Disposition     DISCHARGE.  The patient is discharged back to their place of residence.  Discharge diagnosis, instructions and plan were discussed and understood. At the time of discharge the patient was comfortable and was in no apparent distress. Patient is aware of diagnostic uncertainty and was notified though testing is negative here, there is a very small chance that pathology may be missed.  The patient understands these risks and the patient /family understood to return immediately to the emergency department if the symptoms worsen or if they have any additional concerns.    FOLLOW UP  Primary care provider in 1-2 days.        ED Prescriptions    None            Xu Muller,   04/26/24 0019       Xu Muller,   04/26/24 0057

## 2024-05-09 ENCOUNTER — OFFICE VISIT (OUTPATIENT)
Dept: PRIMARY CARE | Facility: CLINIC | Age: 64
End: 2024-05-09
Payer: COMMERCIAL

## 2024-05-09 VITALS
SYSTOLIC BLOOD PRESSURE: 110 MMHG | WEIGHT: 176 LBS | HEART RATE: 93 BPM | DIASTOLIC BLOOD PRESSURE: 82 MMHG | HEIGHT: 67 IN | BODY MASS INDEX: 27.62 KG/M2

## 2024-05-09 DIAGNOSIS — R20.2 NUMBNESS AND TINGLING IN BOTH HANDS: Primary | ICD-10-CM

## 2024-05-09 DIAGNOSIS — R20.2 NUMBNESS AND TINGLING OF BOTH LEGS: ICD-10-CM

## 2024-05-09 DIAGNOSIS — R20.0 NUMBNESS AND TINGLING IN BOTH HANDS: Primary | ICD-10-CM

## 2024-05-09 DIAGNOSIS — R20.0 NUMBNESS AND TINGLING OF BOTH LEGS: ICD-10-CM

## 2024-05-09 LAB
ERYTHROCYTE [DISTWIDTH] IN BLOOD BY AUTOMATED COUNT: 13.2 % (ref 11.5–14.5)
FOLATE SERPL-MCNC: 11.9 NG/ML
HCT VFR BLD AUTO: 38.1 % (ref 36–46)
HGB BLD-MCNC: 12.3 G/DL (ref 12–16)
MCH RBC QN AUTO: 29.9 PG (ref 26–34)
MCHC RBC AUTO-ENTMCNC: 32.3 G/DL (ref 32–36)
MCV RBC AUTO: 93 FL (ref 80–100)
NRBC BLD-RTO: 0 /100 WBCS (ref 0–0)
PLATELET # BLD AUTO: 330 X10*3/UL (ref 150–450)
RBC # BLD AUTO: 4.12 X10*6/UL (ref 4–5.2)
TSH SERPL-ACNC: 0.39 MIU/L (ref 0.44–3.98)
VIT B12 SERPL-MCNC: 288 PG/ML (ref 211–911)
WBC # BLD AUTO: 7.2 X10*3/UL (ref 4.4–11.3)

## 2024-05-09 PROCEDURE — 82746 ASSAY OF FOLIC ACID SERUM: CPT

## 2024-05-09 PROCEDURE — 36415 COLL VENOUS BLD VENIPUNCTURE: CPT

## 2024-05-09 PROCEDURE — 84443 ASSAY THYROID STIM HORMONE: CPT

## 2024-05-09 PROCEDURE — 82607 VITAMIN B-12: CPT

## 2024-05-09 PROCEDURE — 85027 COMPLETE CBC AUTOMATED: CPT

## 2024-05-09 PROCEDURE — 1036F TOBACCO NON-USER: CPT | Performed by: FAMILY MEDICINE

## 2024-05-09 PROCEDURE — 99203 OFFICE O/P NEW LOW 30 MIN: CPT | Performed by: FAMILY MEDICINE

## 2024-05-09 ASSESSMENT — ENCOUNTER SYMPTOMS
OCCASIONAL FEELINGS OF UNSTEADINESS: 0
LOSS OF SENSATION IN FEET: 0
DEPRESSION: 0

## 2024-05-09 NOTE — PROGRESS NOTES
Pt is here to [facility], pt has concerns of numbness in both hands, feet, legs and arms. Pt also has concerns of lower back pain since she was 23 years old.         Chief Complaint:  No chief complaint on file.  History Of Present Illness:   Cristal Kilgore is a 63 y.o. female        Patient is a 62 yo female with a PMH of back pain, schizophrenia, disorder who presents to the clinic today for the chief complaint of myalgias.     Patient states that her body feels like its on fire/needle sensation hurts, lightheaded, headache. Patient states the pain in her arms, and legs happens throughout the day coming and going. States she has taken oxycodone and two other more potent medications in the past none of which has worked. Patient states ibuprofen does not helped. Endorses that her feet are on fire.    Patient states she feels as if someone is taking over body.  “my skin is crawling”. “When I go near a tv the TV latches onto me and onto my eyes”. “The cellphone at peoples are latching onto me”. “When im in traffic and someone pulls up to me I feel like someone is stabbing me with needles in my feet”    Patient has been seen multiple times for similar symptoms in the past per chart review    Patient denies being a harm to herself and or anyone else   PMH:   psychotic disorder, chronic back pain, and schizophrenia   Healthcare team:  Patient is unware of who her pain management doctor or mental health providers are in the past.     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   Mood:   “I feel overwhelmed all the time”  Sleep:   Review of Systems:  Constitutional: Admits to   - fever   - chills   - night sweats      Cardiovascular: Admits to occasional   - chest pain  - chest heaviness     Musculoskeletal: Admits to   - muscle pain  - joint pain     Respiratory: Admits to   - shortness of breath  - difficulty breathing     Neurological: Denies  - loss of consciousness  - tremors  - dizzy spells  - numbness   -  "tingling     Gastrointestinal: Denies   - abdominal pain  - nausea  - vomiting  - constipation  - diarrhea  - bloody stools  - loss of bowel control      Psychological: Admits   - feelings of depression  - feelings of anxiety.      Positive  Psychological:   - feeling generally happy  - feeling safe at home       Last Recorded Vitals:  Vitals:    05/09/24 1350   BP: 110/82   Pulse: 93   Weight: 79.8 kg (176 lb)   Height: 1.702 m (5' 7\")        Past Medical History:  She has no past medical history on file.     Past Surgical History:  She has no past surgical history on file.     Social History:  She reports that she has never smoked. She has never used smokeless tobacco. No history on file for alcohol use and drug use.     Family History:  No family history on file.  Allergies:  Patient has no known allergies.     Outpatient Medications:  Current Outpatient Medications   Medication Instructions    cyclobenzaprine (FLEXERIL) 10 mg, oral, 3 times daily PRN        Physical Exam:  GENERAL: Crying throughout exam. Labile mood. Episodes of hysteria    PSYCH: Labile mood. Episodes of hysteria   HEAD: normocephalic   EYES: PERRL, EOMI. vision is grossly intact.  NECK: Neck supple, non-tender..  CARDIAC: RRR, No murmur,No carotid bruits, No appreciable JVD.   LUNGS: Good respiratory effort.  Clear to auscultation b/l without rales, rhonchi, wheezing.   ABD: soft, nontender, no masses palpated.     GAIT: Normal    SKIN: Skin normal color  no lesions or eruptions.  MUSCULOSKELETAL: no Pain to touch to bilateral lower and upper extremities.     CS med assistant in room for exam      Last Labs:  CBC -  Lab Results   Component Value Date    WBC 6.8 04/25/2024    HGB 12.1 04/25/2024    HCT 36.8 04/25/2024    MCV 92 04/25/2024     04/25/2024        CMP -  Lab Results   Component Value Date    CALCIUM 9.5 04/25/2024    PHOS 3.1 12/16/2021    PROT 6.8 04/25/2024    ALBUMIN 4.4 04/25/2024    AST 11 04/25/2024    ALT 12 " "04/25/2024    ALKPHOS 96 04/25/2024    BILITOT 0.3 04/25/2024                 Assessment/Plan   Problem List Items Addressed This Visit             ICD-10-CM    Numbness and tingling in both hands - Primary R20.0, R20.2    Numbness and tingling of both legs R20.0, R20.2      Welcome to the office. Please let the office know at least one week in advance if you need an immunization or booster shot.     Recommend following up with your PCP in about a month.    Drawing blood work to look for causes of extremity numbness and tingling.    Recommend referral to psychiatry as well as neurology.    Although you state that you are \"not crazy\", I think it is reasonable that if this condition worsens for you to seek care in the emergency department for potential acute psychosis, also if you have any thoughts of harming yourself or harming others I recommend emergent evaluation in the emergency department.      Regarding referrals, you can call the following phone number to attempt to schedule: [phone].  Another potential phone number is: [phone] ([phone]).  The number for pediatric referrals is: [phone].       Maximo Wisdom, DO  "

## 2024-05-15 DIAGNOSIS — R79.89 ABNORMAL TSH: Primary | ICD-10-CM

## 2024-05-16 ENCOUNTER — HOSPITAL ENCOUNTER (EMERGENCY)
Facility: HOSPITAL | Age: 64
Discharge: HOME | End: 2024-05-16
Attending: STUDENT IN AN ORGANIZED HEALTH CARE EDUCATION/TRAINING PROGRAM
Payer: COMMERCIAL

## 2024-05-16 VITALS
SYSTOLIC BLOOD PRESSURE: 117 MMHG | BODY MASS INDEX: 27.68 KG/M2 | HEART RATE: 69 BPM | RESPIRATION RATE: 18 BRPM | HEIGHT: 67 IN | DIASTOLIC BLOOD PRESSURE: 86 MMHG | WEIGHT: 176.37 LBS | OXYGEN SATURATION: 98 % | TEMPERATURE: 98.4 F

## 2024-05-16 DIAGNOSIS — R51.9 ACUTE NONINTRACTABLE HEADACHE, UNSPECIFIED HEADACHE TYPE: ICD-10-CM

## 2024-05-16 DIAGNOSIS — R20.2 PARESTHESIA: Primary | ICD-10-CM

## 2024-05-16 LAB
ANION GAP SERPL CALC-SCNC: 14 MMOL/L (ref 10–20)
BASOPHILS # BLD AUTO: 0.04 X10*3/UL (ref 0–0.1)
BASOPHILS NFR BLD AUTO: 0.8 %
BUN SERPL-MCNC: 9 MG/DL (ref 6–23)
CALCIUM SERPL-MCNC: 9.2 MG/DL (ref 8.6–10.3)
CHLORIDE SERPL-SCNC: 104 MMOL/L (ref 98–107)
CO2 SERPL-SCNC: 24 MMOL/L (ref 21–32)
CREAT SERPL-MCNC: 0.91 MG/DL (ref 0.5–1.05)
EGFRCR SERPLBLD CKD-EPI 2021: 71 ML/MIN/1.73M*2
EOSINOPHIL # BLD AUTO: 0.22 X10*3/UL (ref 0–0.7)
EOSINOPHIL NFR BLD AUTO: 4.6 %
ERYTHROCYTE [DISTWIDTH] IN BLOOD BY AUTOMATED COUNT: 13.2 % (ref 11.5–14.5)
GLUCOSE SERPL-MCNC: 127 MG/DL (ref 74–99)
HCT VFR BLD AUTO: 35.9 % (ref 36–46)
HGB BLD-MCNC: 11.7 G/DL (ref 12–16)
IMM GRANULOCYTES # BLD AUTO: 0.01 X10*3/UL (ref 0–0.7)
IMM GRANULOCYTES NFR BLD AUTO: 0.2 % (ref 0–0.9)
LYMPHOCYTES # BLD AUTO: 1.89 X10*3/UL (ref 1.2–4.8)
LYMPHOCYTES NFR BLD AUTO: 39.5 %
MCH RBC QN AUTO: 30.8 PG (ref 26–34)
MCHC RBC AUTO-ENTMCNC: 32.6 G/DL (ref 32–36)
MCV RBC AUTO: 95 FL (ref 80–100)
MONOCYTES # BLD AUTO: 0.26 X10*3/UL (ref 0.1–1)
MONOCYTES NFR BLD AUTO: 5.4 %
NEUTROPHILS # BLD AUTO: 2.36 X10*3/UL (ref 1.2–7.7)
NEUTROPHILS NFR BLD AUTO: 49.5 %
NRBC BLD-RTO: 0 /100 WBCS (ref 0–0)
PLATELET # BLD AUTO: 295 X10*3/UL (ref 150–450)
POTASSIUM SERPL-SCNC: 3.7 MMOL/L (ref 3.5–5.3)
RBC # BLD AUTO: 3.8 X10*6/UL (ref 4–5.2)
SODIUM SERPL-SCNC: 138 MMOL/L (ref 136–145)
WBC # BLD AUTO: 4.8 X10*3/UL (ref 4.4–11.3)

## 2024-05-16 PROCEDURE — 36415 COLL VENOUS BLD VENIPUNCTURE: CPT | Performed by: EMERGENCY MEDICINE

## 2024-05-16 PROCEDURE — 85025 COMPLETE CBC W/AUTO DIFF WBC: CPT | Performed by: STUDENT IN AN ORGANIZED HEALTH CARE EDUCATION/TRAINING PROGRAM

## 2024-05-16 PROCEDURE — 80048 BASIC METABOLIC PNL TOTAL CA: CPT | Performed by: STUDENT IN AN ORGANIZED HEALTH CARE EDUCATION/TRAINING PROGRAM

## 2024-05-16 PROCEDURE — 99283 EMERGENCY DEPT VISIT LOW MDM: CPT

## 2024-05-16 RX ORDER — KETOROLAC TROMETHAMINE 30 MG/ML
15 INJECTION, SOLUTION INTRAMUSCULAR; INTRAVENOUS ONCE
Status: DISCONTINUED | OUTPATIENT
Start: 2024-05-16 | End: 2024-05-16

## 2024-05-16 RX ORDER — ACETAMINOPHEN 325 MG/1
975 TABLET ORAL ONCE
Status: COMPLETED | OUTPATIENT
Start: 2024-05-16 | End: 2024-05-16

## 2024-05-16 RX ADMIN — ACETAMINOPHEN 975 MG: 325 TABLET ORAL at 13:11

## 2024-05-16 ASSESSMENT — PAIN - FUNCTIONAL ASSESSMENT: PAIN_FUNCTIONAL_ASSESSMENT: 0-10

## 2024-05-16 ASSESSMENT — COLUMBIA-SUICIDE SEVERITY RATING SCALE - C-SSRS
6. HAVE YOU EVER DONE ANYTHING, STARTED TO DO ANYTHING, OR PREPARED TO DO ANYTHING TO END YOUR LIFE?: NO
1. IN THE PAST MONTH, HAVE YOU WISHED YOU WERE DEAD OR WISHED YOU COULD GO TO SLEEP AND NOT WAKE UP?: NO
2. HAVE YOU ACTUALLY HAD ANY THOUGHTS OF KILLING YOURSELF?: NO

## 2024-05-16 ASSESSMENT — PAIN SCALES - GENERAL: PAINLEVEL_OUTOF10: 10 - WORST POSSIBLE PAIN

## 2024-05-16 NOTE — DISCHARGE INSTRUCTIONS
You have been seen at a [facility].  Please follow-up with your primary care provider in the next 1 to 2 days for further evaluation and routine follow-up.  Please return to the emergency room if having any worsening symptoms.  Please follow-up with any specialists if discussed during your emergency room stay.

## 2024-05-16 NOTE — ED PROVIDER NOTES
EMERGENCY MEDICINE EVALUATION NOTE    History of Present Illness     Chief Complaint:   Chief Complaint   Patient presents with    multiple medical complaints       HPI: Cristal Kilgore is a 63 y.o. female with past medical history of chronic back pain, schizophrenia disorder who presents with complaint of multiple complaints.  Patient states over the past 1 to 2 weeks she has had intermittent sensations of pain involving her bilateral feet with a pins-and-needles sensation and occasional sharp pain.  She also believes that her feet are swollen bilaterally.  She denies any prior similar pain or discomfort.  She did see her primary care provider on 1 week ago for the symptoms and did have plan to see neurology as well as possibly psychiatry for further evaluation.  Patient denies any history of diabetes.  Denies any fall or known trauma.  Denies any fever, chills, chest pain, shortness of breath.    Previous History   No past medical history on file.  No past surgical history on file.  Social History     Tobacco Use    Smoking status: Never    Smokeless tobacco: Never     No family history on file.  No Known Allergies  Current Outpatient Medications   Medication Instructions    cyclobenzaprine (FLEXERIL) 10 mg, oral, 3 times daily PRN       Physical Exam     Appearance: Alert, oriented , cooperative,  in acute distress. Well nourished & well hydrated.     Skin: Intact,  dry skin, no lesions, rash, petechiae or purpura.      Eyes: PERRLA, EOMs intact,  Conjunctiva pink with no redness or exudates. Cornea & anterior chamber are clear, Eyelids without lesions. No scleral icterus.      ENT: Hearing grossly intact. External auditory canals patent, tympanic membranes intact with visible landmarks. Nares patent, mucus membranes moist. Dentition without lesions. Pharynx clear, uvula midline.      Neck: Supple, without meningismus. Thyroid not palpable. Trachea at midline. No lymphadenopathy.     Pulmonary: Clear bilaterally  "with good chest wall excursion. No rales, rhonchi or wheezing. No accessory muscle use or stridor.     Cardiac: Normal S1, S2 without murmur, rub, gallop or extrasystole. No JVD, Carotids without bruits.     Abdomen: Soft, nontender, active bowel sounds.  No palpable organomegaly.  No rebound or guarding.  No CVA tenderness.     Genitourinary: Exam deferred.     Musculoskeletal: Full range of motion. no pain, edema, or deformity. Pulses full and equal. No cyanosis or clubbing.      Neurological:  Cranial nerves II through XII are grossly intact, finger-nose touch is normal, mild hyperesthesias to the bilateral feet, no weakness, no focal findings identified.     Psychiatric: Appropriate mood and affect.      Results     Labs Reviewed   CBC WITH AUTO DIFFERENTIAL - Abnormal       Result Value    WBC 4.8      nRBC 0.0      RBC 3.80 (*)     Hemoglobin 11.7 (*)     Hematocrit 35.9 (*)     MCV 95      MCH 30.8      MCHC 32.6      RDW 13.2      Platelets 295      Neutrophils % 49.5      Immature Granulocytes %, Automated 0.2      Lymphocytes % 39.5      Monocytes % 5.4      Eosinophils % 4.6      Basophils % 0.8      Neutrophils Absolute 2.36      Immature Granulocytes Absolute, Automated 0.01      Lymphocytes Absolute 1.89      Monocytes Absolute 0.26      Eosinophils Absolute 0.22      Basophils Absolute 0.04     BASIC METABOLIC PANEL - Abnormal    Glucose 127 (*)     Sodium 138      Potassium 3.7      Chloride 104      Bicarbonate 24      Anion Gap 14      Urea Nitrogen 9      Creatinine 0.91      eGFR 71      Calcium 9.2       No orders to display         ED Course & Medical Decision Making     Medications   acetaminophen (Tylenol) tablet 975 mg (975 mg oral Given 5/16/24 1311)     Diagnoses as of 05/16/24 1312   Paresthesia   Acute nonintractable headache, unspecified headache type     Heart Rate:  [63-77]   Temperature:  [36.9 °C (98.4 °F)]   Respirations:  [16]   BP: ()/(63-86)   Height:  [170.2 cm (5' 7\")] "   Weight:  [80 kg (176 lb 5.9 oz)]   Pulse Ox:  [98 %-99 %]      Cristal Kilgore is a 63 y.o. female with past medical history of chronic back pain, schizophrenia disorder who presents with complaint of multiple complaints.  Patient hemodynamically stable and afebrile.  No evidence of acute psychosis.  She does have some paresthesias in the bilateral feet although no significant edema or concern for underlying DVT which was considered.  This could be secondary to bilateral lower extremity neuropathy and possible underlying diabetes or electrolyte abnormality.  Lab work completed and was reassuring with no significant hyperglycemia or anion gap.  No renal dysfunction or electrolyte normality noted.  Mild anemia with a hemoglobin of 11.7 and no leukocytosis.  Given clinical exam findings no need for additional emergent imaging or lab work.  This could be secondary to peripheral neuropathy.  She was given a neurology referral for outpatient follow-up.  Otherwise stable for discharge home this time.  She was treated for her tension headache with Tylenol with relief.    Procedures   Procedures    Diagnosis     1. Paresthesia    2. Acute nonintractable headache, unspecified headache type        Disposition     DISCHARGE.  The patient is discharged back to their place of residence.  Discharge diagnosis, instructions and plan were discussed and understood. At the time of discharge the patient was comfortable and was in no apparent distress. Patient is aware of diagnostic uncertainty and was notified though testing is negative here, there is a very small chance that pathology may be missed.  The patient understands these risks and the patient /family understood to return immediately to the emergency department if the symptoms worsen or if they have any additional concerns.    FOLLOW UP  Primary care provider in 1-2 days.        ED Prescriptions    None            Xu Muller DO  05/16/24 4047

## 2024-06-10 ENCOUNTER — HOSPITAL ENCOUNTER (EMERGENCY)
Facility: HOSPITAL | Age: 64
Discharge: HOME | End: 2024-06-11
Attending: EMERGENCY MEDICINE
Payer: COMMERCIAL

## 2024-06-10 ENCOUNTER — APPOINTMENT (OUTPATIENT)
Dept: RADIOLOGY | Facility: HOSPITAL | Age: 64
End: 2024-06-10
Payer: COMMERCIAL

## 2024-06-10 ENCOUNTER — APPOINTMENT (OUTPATIENT)
Dept: CARDIOLOGY | Facility: HOSPITAL | Age: 64
End: 2024-06-10
Payer: COMMERCIAL

## 2024-06-10 VITALS
SYSTOLIC BLOOD PRESSURE: 139 MMHG | HEIGHT: 67 IN | RESPIRATION RATE: 18 BRPM | HEART RATE: 60 BPM | DIASTOLIC BLOOD PRESSURE: 92 MMHG | WEIGHT: 167 LBS | BODY MASS INDEX: 26.21 KG/M2 | OXYGEN SATURATION: 95 % | TEMPERATURE: 97.3 F

## 2024-06-10 DIAGNOSIS — R55 SYNCOPE, UNSPECIFIED SYNCOPE TYPE: ICD-10-CM

## 2024-06-10 DIAGNOSIS — T16.1XXA FOREIGN BODY OF RIGHT EAR, INITIAL ENCOUNTER: ICD-10-CM

## 2024-06-10 DIAGNOSIS — R07.9 CHEST PAIN, UNSPECIFIED TYPE: Primary | ICD-10-CM

## 2024-06-10 LAB
ALBUMIN SERPL BCP-MCNC: 4.3 G/DL (ref 3.4–5)
ALP SERPL-CCNC: 115 U/L (ref 33–136)
ALT SERPL W P-5'-P-CCNC: 10 U/L (ref 7–45)
ANION GAP SERPL CALC-SCNC: 14 MMOL/L (ref 10–20)
APTT PPP: 32 SECONDS (ref 27–38)
AST SERPL W P-5'-P-CCNC: 12 U/L (ref 9–39)
BASOPHILS # BLD AUTO: 0.04 X10*3/UL (ref 0–0.1)
BASOPHILS NFR BLD AUTO: 0.5 %
BILIRUB SERPL-MCNC: 0.3 MG/DL (ref 0–1.2)
BNP SERPL-MCNC: 12 PG/ML (ref 0–99)
BUN SERPL-MCNC: 10 MG/DL (ref 6–23)
CALCIUM SERPL-MCNC: 9.9 MG/DL (ref 8.6–10.3)
CARDIAC TROPONIN I PNL SERPL HS: 3 NG/L (ref 0–13)
CARDIAC TROPONIN I PNL SERPL HS: <3 NG/L (ref 0–13)
CHLORIDE SERPL-SCNC: 101 MMOL/L (ref 98–107)
CO2 SERPL-SCNC: 27 MMOL/L (ref 21–32)
CREAT SERPL-MCNC: 0.91 MG/DL (ref 0.5–1.05)
EGFRCR SERPLBLD CKD-EPI 2021: 71 ML/MIN/1.73M*2
EOSINOPHIL # BLD AUTO: 0.15 X10*3/UL (ref 0–0.7)
EOSINOPHIL NFR BLD AUTO: 2 %
ERYTHROCYTE [DISTWIDTH] IN BLOOD BY AUTOMATED COUNT: 13.2 % (ref 11.5–14.5)
GLUCOSE SERPL-MCNC: 103 MG/DL (ref 74–99)
HCT VFR BLD AUTO: 39.6 % (ref 36–46)
HGB BLD-MCNC: 12.5 G/DL (ref 12–16)
IMM GRANULOCYTES # BLD AUTO: 0.02 X10*3/UL (ref 0–0.7)
IMM GRANULOCYTES NFR BLD AUTO: 0.3 % (ref 0–0.9)
INR PPP: 1 (ref 0.9–1.1)
LYMPHOCYTES # BLD AUTO: 2.81 X10*3/UL (ref 1.2–4.8)
LYMPHOCYTES NFR BLD AUTO: 38.3 %
MAGNESIUM SERPL-MCNC: 2.1 MG/DL (ref 1.6–2.4)
MCH RBC QN AUTO: 29.8 PG (ref 26–34)
MCHC RBC AUTO-ENTMCNC: 31.6 G/DL (ref 32–36)
MCV RBC AUTO: 95 FL (ref 80–100)
MONOCYTES # BLD AUTO: 0.6 X10*3/UL (ref 0.1–1)
MONOCYTES NFR BLD AUTO: 8.2 %
NEUTROPHILS # BLD AUTO: 3.71 X10*3/UL (ref 1.2–7.7)
NEUTROPHILS NFR BLD AUTO: 50.7 %
NRBC BLD-RTO: 0 /100 WBCS (ref 0–0)
PLATELET # BLD AUTO: 350 X10*3/UL (ref 150–450)
POTASSIUM SERPL-SCNC: 3.3 MMOL/L (ref 3.5–5.3)
PROT SERPL-MCNC: 7.5 G/DL (ref 6.4–8.2)
PROTHROMBIN TIME: 11.8 SECONDS (ref 9.8–12.8)
RBC # BLD AUTO: 4.19 X10*6/UL (ref 4–5.2)
SARS-COV-2 RNA RESP QL NAA+PROBE: NOT DETECTED
SODIUM SERPL-SCNC: 139 MMOL/L (ref 136–145)
WBC # BLD AUTO: 7.3 X10*3/UL (ref 4.4–11.3)

## 2024-06-10 PROCEDURE — 84075 ASSAY ALKALINE PHOSPHATASE: CPT | Performed by: EMERGENCY MEDICINE

## 2024-06-10 PROCEDURE — 80053 COMPREHEN METABOLIC PANEL: CPT | Performed by: EMERGENCY MEDICINE

## 2024-06-10 PROCEDURE — 93005 ELECTROCARDIOGRAM TRACING: CPT

## 2024-06-10 PROCEDURE — 99283 EMERGENCY DEPT VISIT LOW MDM: CPT | Mod: 25,CS

## 2024-06-10 PROCEDURE — 71045 X-RAY EXAM CHEST 1 VIEW: CPT | Performed by: RADIOLOGY

## 2024-06-10 PROCEDURE — 2500000004 HC RX 250 GENERAL PHARMACY W/ HCPCS (ALT 636 FOR OP/ED): Performed by: EMERGENCY MEDICINE

## 2024-06-10 PROCEDURE — 36415 COLL VENOUS BLD VENIPUNCTURE: CPT | Performed by: EMERGENCY MEDICINE

## 2024-06-10 PROCEDURE — 71045 X-RAY EXAM CHEST 1 VIEW: CPT

## 2024-06-10 PROCEDURE — 85025 COMPLETE CBC W/AUTO DIFF WBC: CPT | Performed by: EMERGENCY MEDICINE

## 2024-06-10 PROCEDURE — 87635 SARS-COV-2 COVID-19 AMP PRB: CPT | Performed by: EMERGENCY MEDICINE

## 2024-06-10 PROCEDURE — 83880 ASSAY OF NATRIURETIC PEPTIDE: CPT | Performed by: EMERGENCY MEDICINE

## 2024-06-10 PROCEDURE — 85730 THROMBOPLASTIN TIME PARTIAL: CPT | Performed by: EMERGENCY MEDICINE

## 2024-06-10 PROCEDURE — 84484 ASSAY OF TROPONIN QUANT: CPT | Performed by: EMERGENCY MEDICINE

## 2024-06-10 PROCEDURE — 85610 PROTHROMBIN TIME: CPT | Performed by: EMERGENCY MEDICINE

## 2024-06-10 PROCEDURE — 84484 ASSAY OF TROPONIN QUANT: CPT | Mod: 91 | Performed by: EMERGENCY MEDICINE

## 2024-06-10 PROCEDURE — 83735 ASSAY OF MAGNESIUM: CPT | Performed by: EMERGENCY MEDICINE

## 2024-06-10 RX ORDER — POTASSIUM CHLORIDE 1.5 G/1.58G
20 POWDER, FOR SOLUTION ORAL ONCE
Status: COMPLETED | OUTPATIENT
Start: 2024-06-10 | End: 2024-06-11

## 2024-06-10 RX ORDER — ACETAMINOPHEN 325 MG/1
975 TABLET ORAL ONCE
Status: COMPLETED | OUTPATIENT
Start: 2024-06-10 | End: 2024-06-11

## 2024-06-10 RX ADMIN — SODIUM CHLORIDE 1000 ML: 9 INJECTION, SOLUTION INTRAVENOUS at 22:33

## 2024-06-10 ASSESSMENT — PAIN DESCRIPTION - PAIN TYPE: TYPE: ACUTE PAIN

## 2024-06-10 ASSESSMENT — PAIN DESCRIPTION - LOCATION: LOCATION: EAR

## 2024-06-10 ASSESSMENT — PAIN DESCRIPTION - ORIENTATION: ORIENTATION: RIGHT

## 2024-06-10 ASSESSMENT — PAIN SCALES - GENERAL: PAINLEVEL_OUTOF10: 10 - WORST POSSIBLE PAIN

## 2024-06-10 ASSESSMENT — PAIN - FUNCTIONAL ASSESSMENT: PAIN_FUNCTIONAL_ASSESSMENT: 0-10

## 2024-06-10 NOTE — ED TRIAGE NOTES
Pt presents to ED for R ear hearing loss x3 days and unknown cause to open abrasions on R inner lateral leg and peeling skin to R foot. Pt denies tactile, auditory hallucinations, SI/HI. Pt reports CP.

## 2024-06-11 PROCEDURE — 2500000005 HC RX 250 GENERAL PHARMACY W/O HCPCS: Performed by: EMERGENCY MEDICINE

## 2024-06-11 PROCEDURE — 2500000001 HC RX 250 WO HCPCS SELF ADMINISTERED DRUGS (ALT 637 FOR MEDICARE OP): Performed by: EMERGENCY MEDICINE

## 2024-06-11 RX ORDER — LIDOCAINE HYDROCHLORIDE AND EPINEPHRINE 10; 10 MG/ML; UG/ML
10 INJECTION, SOLUTION INFILTRATION; PERINEURAL ONCE
Status: COMPLETED | OUTPATIENT
Start: 2024-06-11 | End: 2024-06-11

## 2024-06-11 RX ADMIN — ACETAMINOPHEN 975 MG: 325 TABLET ORAL at 00:12

## 2024-06-11 RX ADMIN — POTASSIUM CHLORIDE 20 MEQ: 1.5 POWDER, FOR SOLUTION ORAL at 00:12

## 2024-06-11 RX ADMIN — LIDOCAINE HYDROCHLORIDE,EPINEPHRINE BITARTRATE 10 ML: 10; .01 INJECTION, SOLUTION INFILTRATION; PERINEURAL at 00:27

## 2024-06-11 ASSESSMENT — PAIN DESCRIPTION - ORIENTATION: ORIENTATION: RIGHT

## 2024-06-11 ASSESSMENT — PAIN SCALES - GENERAL
PAINLEVEL_OUTOF10: 10 - WORST POSSIBLE PAIN
PAINLEVEL_OUTOF10: 10 - WORST POSSIBLE PAIN

## 2024-06-11 ASSESSMENT — PAIN DESCRIPTION - LOCATION: LOCATION: EAR

## 2024-06-11 ASSESSMENT — PAIN - FUNCTIONAL ASSESSMENT: PAIN_FUNCTIONAL_ASSESSMENT: 0-10

## 2024-06-11 NOTE — ED PROVIDER NOTES
HPI   Chief Complaint   Patient presents with    Hearing Problem    Leg Pain       HPI  Patient presents with over a month of bilateral paresthesias.  She also complains of pain in both ears.  She placed cotton in her right ear and now cannot remove it.  She states she has discharge there.  She notes a slight cough and runny nose.  States that she has also body aches in her muscles on both sides with cramps.  She denies any fever, vomiting, diarrhea constipation or other symptoms.  She states that 2 days ago she had a syncopal episode.  States that she has some mild chest discomfort currently.                  No data recorded                     Patient History   No past medical history on file.  No past surgical history on file.  No family history on file.  Social History     Tobacco Use    Smoking status: Never    Smokeless tobacco: Never   Substance Use Topics    Alcohol use: Not on file    Drug use: Not on file       Physical Exam   ED Triage Vitals [06/10/24 1733]   Temperature Heart Rate Respirations BP   36.3 °C (97.3 °F) 74 19 147/88      Pulse Ox Temp Source Heart Rate Source Patient Position   98 % Oral Monitor --      BP Location FiO2 (%)     -- --       Physical Exam  Vitals and nursing note reviewed.   Constitutional:       General: She is not in acute distress.     Appearance: She is well-developed.   HENT:      Head: Normocephalic and atraumatic.      Right Ear: There is impacted cerumen.      Left Ear: Tympanic membrane normal.      Ears:      Comments: Cotton or other foreign material in right ear cannot see  Eyes:      Conjunctiva/sclera: Conjunctivae normal.   Cardiovascular:      Rate and Rhythm: Normal rate and regular rhythm.      Heart sounds: No murmur heard.  Pulmonary:      Effort: Pulmonary effort is normal. No respiratory distress.      Breath sounds: Normal breath sounds.   Abdominal:      Palpations: Abdomen is soft.      Tenderness: There is no abdominal tenderness.   Musculoskeletal:          General: No swelling.      Cervical back: Neck supple.   Skin:     General: Skin is warm and dry.      Capillary Refill: Capillary refill takes less than 2 seconds.   Neurological:      Mental Status: She is alert.   Psychiatric:         Mood and Affect: Mood normal.         ED Course & MDM   Diagnoses as of 06/12/24 2116   Chest pain, unspecified type   Syncope, unspecified syncope type   Foreign body of right ear, initial encounter       Medical Decision Making  Will assess electrolytes.  Will attempt to remove the cotton the patient is placed.  I placed lidocaine in the ear.  I did offer anxiolysis or even mild sedation but patient prefers to follow-up with an ENT doctor.  Patient's delta troponin was negative.  Feel that she needs an inpatient chest pain workup.  Feel that she is heart score of 3 otherwise.    Procedure  Procedures     Nando Peterson MD  06/11/24 0118       Nando Peterson MD  06/12/24 2116

## 2024-06-15 LAB
ATRIAL RATE: 74 BPM
P AXIS: -15 DEGREES
P OFFSET: 195 MS
P ONSET: 166 MS
PR INTERVAL: 104 MS
Q ONSET: 218 MS
QRS COUNT: 12 BEATS
QRS DURATION: 78 MS
QT INTERVAL: 400 MS
QTC CALCULATION(BAZETT): 444 MS
QTC FREDERICIA: 429 MS
R AXIS: 33 DEGREES
T AXIS: 24 DEGREES
T OFFSET: 418 MS
VENTRICULAR RATE: 74 BPM

## 2024-06-20 ENCOUNTER — APPOINTMENT (OUTPATIENT)
Dept: PRIMARY CARE | Facility: CLINIC | Age: 64
End: 2024-06-20
Payer: COMMERCIAL